# Patient Record
Sex: MALE | Race: WHITE | NOT HISPANIC OR LATINO | Employment: FULL TIME | URBAN - METROPOLITAN AREA
[De-identification: names, ages, dates, MRNs, and addresses within clinical notes are randomized per-mention and may not be internally consistent; named-entity substitution may affect disease eponyms.]

---

## 2021-10-22 ENCOUNTER — HISTORICAL (OUTPATIENT)
Dept: ADMINISTRATIVE | Facility: HOSPITAL | Age: 26
End: 2021-10-22

## 2021-10-22 LAB
ABS NEUT (OLG): 3.1 X10(3)/MCL (ref 2.1–9.2)
ALBUMIN SERPL-MCNC: 4.5 GM/DL (ref 3.4–5)
ALBUMIN/GLOB SERPL: 1.88 {RATIO} (ref 1.5–2.5)
ALP SERPL-CCNC: 58 UNIT/L (ref 38–126)
ALT SERPL-CCNC: 17 UNIT/L (ref 7–52)
AST SERPL-CCNC: 13 UNIT/L (ref 15–37)
BILIRUB SERPL-MCNC: 0.5 MG/DL (ref 0.2–1)
BILIRUBIN DIRECT+TOT PNL SERPL-MCNC: 0.1 MG/DL (ref 0–0.5)
BILIRUBIN DIRECT+TOT PNL SERPL-MCNC: 0.4 MG/DL
BUN SERPL-MCNC: 18 MG/DL (ref 7–18)
CALCIUM SERPL-MCNC: 9.6 MG/DL (ref 8.5–10.1)
CHLORIDE SERPL-SCNC: 104 MMOL/L (ref 98–107)
CHOLEST SERPL-MCNC: 141 MG/DL (ref 0–200)
CHOLEST/HDLC SERPL: 3.4 {RATIO}
CO2 SERPL-SCNC: 29 MMOL/L (ref 21–32)
CREAT SERPL-MCNC: 0.73 MG/DL (ref 0.6–1.3)
ERYTHROCYTE [DISTWIDTH] IN BLOOD BY AUTOMATED COUNT: 12 % (ref 11.5–17)
GLOBULIN SER-MCNC: 2.4 GM/DL (ref 1.2–3)
GLUCOSE SERPL-MCNC: 94 MG/DL (ref 74–106)
HCT VFR BLD AUTO: 40.9 % (ref 42–52)
HDLC SERPL-MCNC: 42 MG/DL (ref 35–60)
HGB BLD-MCNC: 13.8 GM/DL (ref 14–18)
LDLC SERPL CALC-MCNC: 69 MG/DL (ref 0–129)
LYMPHOCYTES # BLD AUTO: 2.3 X10(3)/MCL (ref 0.6–3.4)
LYMPHOCYTES NFR BLD AUTO: 40 % (ref 13–40)
MCH RBC QN AUTO: 29.6 PG (ref 27–31.2)
MCHC RBC AUTO-ENTMCNC: 34 GM/DL (ref 32–36)
MCV RBC AUTO: 88 FL (ref 80–94)
MONOCYTES # BLD AUTO: 0.4 X10(3)/MCL (ref 0.1–1.3)
MONOCYTES NFR BLD AUTO: 6.7 % (ref 0.1–24)
NEUTROPHILS NFR BLD AUTO: 53.3 % (ref 47–80)
PLATELET # BLD AUTO: 334 X10(3)/MCL (ref 130–400)
PMV BLD AUTO: 9.4 FL (ref 9.4–12.4)
POTASSIUM SERPL-SCNC: 4.2 MMOL/L (ref 3.5–5.1)
PROT SERPL-MCNC: 6.9 GM/DL (ref 6.4–8.2)
RBC # BLD AUTO: 4.67 X10(6)/MCL (ref 4.7–6.1)
SODIUM SERPL-SCNC: 141 MMOL/L (ref 136–145)
TRIGL SERPL-MCNC: 89 MG/DL (ref 30–150)
TSH SERPL-ACNC: 0.2 MIU/ML (ref 0.35–4.94)
VLDLC SERPL CALC-MCNC: 17.8 MG/DL
WBC # SPEC AUTO: 5.8 X10(3)/MCL (ref 4.5–11.5)

## 2021-10-25 LAB — EST CREAT CLEARANCE SER (OHS): 123.59 ML/MIN

## 2022-04-10 ENCOUNTER — HISTORICAL (OUTPATIENT)
Dept: ADMINISTRATIVE | Facility: HOSPITAL | Age: 27
End: 2022-04-10

## 2022-04-29 VITALS
WEIGHT: 123.44 LBS | SYSTOLIC BLOOD PRESSURE: 98 MMHG | HEIGHT: 68 IN | DIASTOLIC BLOOD PRESSURE: 64 MMHG | BODY MASS INDEX: 18.71 KG/M2

## 2022-05-02 NOTE — HISTORICAL [facility] CERNER
This is a historical note converted from [employer]. Formatting and pictures may have been removed.  Please reference [employer] for original formatting and attached multimedia. Chief Complaint  WELLNESS CPX FAST, MEDS WORKING WELL  History of Present Illness  25 yo CM presents for a well adult exam. Pt. denies significant weight loss or weight gain, excessive fatigue, headache, hearing loss, vision changes, chest pain or SOB.?  ?  ?   Pt. is in grad school as a  at [employer]. No kids. Not .  PMhx: Anxiety, depression, seasonal allergies, asthma.  ?   Health status: excellent  Exercise:?nothing routine  Diet:?tries to eat healthy  Caffeine: coffee  ETOH: couple of times a year  Tobacco use: none  ?   Specialists:?none  ?   Labs: ordered today; fasting  Immunizations: UTD with tetanus and Covid  Colon Ca Screening: Colonoscopy due at 47  PSA: due at 52  Dental/Vision:?both UTD  Review of Systems  Constitutional:?No weight gain, No fever, No chills, No fatigue.?  Eyes:?No blurring, No visual disturbances.?  Ear/Nose/Mouth/Throat:?No decreased hearing, No ear pain, No nasal congestion, No sore throat.?  Respiratory:?No shortness of breath, No cough, No wheezing.??+ History of asthma  Cardiovascular:?No chest pain, No palpitations, No peripheral edema.?  Gastrointestinal:?No nausea, No vomiting, No diarrhea, No constipation, No abdominal pain.?  Genitourinary:?No dysuria, No hematuria.?  Hematology/Lymphatics:?No bruising tendency, No bleeding tendency, No swollen lymph glands.?  Endocrine:?No excessive thirst, No polyuria, No excessive hunger.?  Musculoskeletal:?No joint pain, No muscle pain, No decreased range of motion.?  Integumentary:?No rash, No pruritus.?  Neurologic:?No abnormal balance, No confusion, No headache.?  Psychiatric:?+?Anxiety,?+ depression, Not suicidal, No hallucinations. ?  Physical Exam  Vitals & Measurements  T:?36.9? ?C (Oral)? HR:?52(Peripheral)? BP:?98/64?  HT:?172?cm?  HT:?172.00?cm? WT:?56?kg? WT:?56.000?kg? BMI:?18.93?  General:?Alert and oriented, No acute distress.?  Eye:?Normal conjunctiva without exudate.  HENMT:?Normocephalic/AT, Normal hearing, Oral mucosa is moist and pink. No LAD.  Neck:?No goiter visualized.?No nodules palpated. No LAD.  Respiratory:?Lungs CTAB, Respirations are non-labored, Breath sounds are equal, Symmetrical chest wall expansion.  Cardiovascular:?Normal rate, Regular rhythm, No murmur, No edema.?  Gastrointestinal:?Non-distended.?Flat, soft, non ttp. No mass or hernia.  Genitourinary:?Deferred.  Musculoskeletal:?Normal ROM, Normal gait, No deformities or amputations.  Integumentary:?Warm, Dry, Intact. No diaphoresis, or flushing.  Neurologic:?No focal deficits, Cranial Nerves II-XII are grossly intact.?  Psychiatric:?Cooperative, Appropriate mood & affect, Normal judgment, Non-suicidal.  Assessment/Plan  1.?Wellness examination?Z00.00  Diet discussed (healthy food choices, reduce portions and overall calorie intake)  Exercise 30-45 minutes 3x per week  Avoid excessive alcohol and tobacco  Immunizations discussed  Monthly testicular exam recommended  Preventative exams discussed  Labs ordered  Ordered:  Automated Diff, Routine collect, 10/22/21 11:36:00 CDT, Blood, Collected, Stop date 10/22/21 11:36:00 CDT, Lab Collect, Wellness examination, 10/22/21 11:36:00 CDT  CBC w/ Auto Diff, Routine collect, 10/22/21 11:36:00 CDT, Blood, Stop date 10/22/21 11:36:00 CDT, Lab Collect, Wellness examination, 10/22/21 11:36:00 CDT  Clinic Follow-Up Wellness, *Est. 10/22/22 3:00:00 CDT, Order for future visit, Wellness examination, [facility]  Comprehensive Metabolic Panel, Routine collect, 10/22/21 11:36:00 CDT, Blood, Stop date 10/22/21 11:36:00 CDT, Lab Collect, Wellness examination, 10/22/21 11:36:00 CDT  Lipid Panel, Routine collect, 10/22/21 11:36:00 CDT, Blood, Stop date 10/22/21 11:36:00 CDT, Lab Collect, Wellness examination, 10/22/21 11:36:00  CDT  Preventative Health Care Est 18-39 years [id number] PC, Wellness examination, [facility], 10/22/21 11:29:00 CDT  Thyroid Stimulating Hormone, Routine collect, 10/22/21 11:36:00 CDT, Blood, Stop date 10/22/21 11:36:00 CDT, Lab Collect, Wellness examination, 10/22/21 11:36:00 CDT  ?  2.?Asthma(  Confirmed  )?J45.909  ?No recent exacerbations  Refilled?albuterol inhaler  ?  3.?Anxiety?F41.9  ?Stable  Continue Zoloft  ?  4.?Depression?F32.9  ?Stable  Continue Zoloft  ?  Orders:  albuterol, 2 puff(s), INH, q4hr, PRN PRN wheezing, # 1 EA, 5 Refill(s), Pharmacy: [facility] #[id number], 172, cm, Height/Length Dosing, 01/15/21 9:16:00 CST, 59.9, kg, Weight Dosing, 01/15/21 9:16:00 CST  Education and counseling done face to face regarding medical conditions and current meds.?Call if?new symptoms develop. ?Should any symptoms ever significantly worsen, go to ER. Follow up as scheduled yearly for wellness or sooner PRN. Will call with any results. The patient is receptive, expresses understanding and is agreeable to plan. All questions have been answered.?  Referrals  Clinic Follow-Up Wellness, *Est. 10/22/22 3:00:00 CDT, Order for future visit, Wellness examination, [facility]   Problem List/Past Medical History  Ongoing  Anxiety  Asthma(  Confirmed  )  Depression  Seasonal allergies(  Confirmed  )  Procedure/Surgical History  Extraction of wisdom tooth  Tonsillectomy   Medications  Proventil HFA 90 mcg/inh inhalation aerosol with adapter, 2 puff(s), INH, q4hr, PRN, 5 refills  Zoloft 50 mg oral tablet, 50 mg= 1 tab(s), Oral, Daily  ZyrTEC  Allergies  ibuprofen?(Edema of face)  Social History  Abuse/Neglect  No, 10/22/2021  Alcohol  Current, 1-2 times per year, 06/22/2020  Employment/School  Student, 01/15/2021  Exercise  Exercise duration: 45. Exercise frequency: 3-4 times/week. Exercise type: Walking, Running., 01/15/2021  Home/Environment  Lives with [employer]., 01/15/2021  Nutrition/Health  Regular,  06/22/2020  Tobacco - Denies Tobacco Use, 10/09/2015  Never (less than 100 in lifetime), N/A, 10/22/2021  Family History  Anxiety: Mother and Sister.  Depression.: Mother and Sister.  Hyperlipidemia.: Father.  Hypertension.: Father.  Immunizations  Vaccine Date Status   tetanus/diphtheria/pertussis, acel(Tdap) 01/15/2021 Given   influenza virus vaccine, inactivated 01/15/2021 Given   influenza virus vaccine, inactivated 01/04/2018 Recorded   meningococcal conjugate vaccine 08/16/2013 Recorded   varicella virus vaccine 07/30/2008 Recorded   tetanus/diphtheria/pertussis, acel(Tdap) 07/30/2008 Recorded   meningococcal conjugate vaccine 07/30/2008 Recorded   influenza virus vaccine, inactivated 10/25/2007 Recorded   diphtheria/pertussis, acel/tetanus ped 11/27/2002 Recorded   poliovirus vaccine, inactivated 02/26/2002 Recorded   measles/mumps/rubella virus vaccine 02/26/2002 Recorded   varicella virus vaccine 06/20/2000 Recorded   measles/mumps/rubella virus vaccine 03/23/1998 Recorded   poliovirus vaccine, live, trivalent 1997 Recorded   hepatitis B pediatric vaccine 1997 Recorded   poliovirus vaccine, live, trivalent 1997 Recorded   poliovirus vaccine, live, trivalent 1997 Recorded   hepatitis B pediatric vaccine 1997 Recorded   hepatitis B pediatric vaccine 1997 Recorded   Health Maintenance  Health Maintenance  ???Pending?(in the next year)  ??? ??Due?  ??? ? ? ?ADL Screening due??10/22/21??and every 1??year(s)  ??? ? ? ?Asthma Management-Asthma Education due??10/22/21??and every 6??month(s)  ??? ? ? ?Asthma Management-Spirometry due??10/22/21??Variable frequency  ??? ? ? ?Asthma Management-[place] Flow due??10/22/21??Variable frequency  ??? ? ? ?Asthma Management-Written Action Plan due??10/22/21??and every 6??month(s)  ??? ??Due In Future?  ??? ? ? ?Obesity Screening not due until??01/01/22??and every 1??year(s)  ??? ? ? ?Alcohol Misuse Screening not due until??01/02/22??and  every 1??year(s)  ???Satisfied?(in the past 1 year)  ??? ??Satisfied?  ??? ? ? ?Alcohol Misuse Screening on??01/15/21.??Satisfied by Landy Nolen LPN  ??? ? ? ?Asthma Management-Asthma Medication Prescribed on??10/22/21.??Satisfied by Shawanda Cortes  ??? ? ? ?Blood Pressure Screening on??10/22/21.??Satisfied by Landy Nolen LPN  ??? ? ? ?Body Mass Index Check on??10/22/21.??Satisfied by Landy Nolen LPN  ??? ? ? ?Depression Screening on??10/22/21.??Satisfied by Landy Nolen LPN  ??? ? ? ?Influenza Vaccine on??10/22/21.??Satisfied by Landy Nolen LPN  ??? ? ? ?Obesity Screening on??10/22/21.??Satisfied by Landy Nolen LPN  ??? ? ? ?Tetanus Vaccine on??01/15/21.??Satisfied by Landy Nolen LPN  ?      Patient condition discussed?in detail with nurse practitioner.? Agree with plan of care?and follow-up.